# Patient Record
Sex: FEMALE | Race: WHITE
[De-identification: names, ages, dates, MRNs, and addresses within clinical notes are randomized per-mention and may not be internally consistent; named-entity substitution may affect disease eponyms.]

---

## 2019-09-01 ENCOUNTER — HOSPITAL ENCOUNTER (OUTPATIENT)
Dept: HOSPITAL 1 - CT | Age: 24
Discharge: HOME | End: 2019-09-01
Payer: COMMERCIAL

## 2019-09-01 ENCOUNTER — HOSPITAL ENCOUNTER (EMERGENCY)
Dept: HOSPITAL 26 - MED | Age: 24
Discharge: HOME | End: 2019-09-01
Payer: SELF-PAY

## 2019-09-01 VITALS — BODY MASS INDEX: 38.57 KG/M2 | HEIGHT: 66 IN | WEIGHT: 240 LBS

## 2019-09-01 VITALS — SYSTOLIC BLOOD PRESSURE: 140 MMHG | DIASTOLIC BLOOD PRESSURE: 72 MMHG

## 2019-09-01 VITALS — SYSTOLIC BLOOD PRESSURE: 93 MMHG | DIASTOLIC BLOOD PRESSURE: 40 MMHG
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
## 2019-09-01 DIAGNOSIS — S09.90XA: Primary | ICD-10-CM
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
## 2019-09-01 DIAGNOSIS — R51: ICD-10-CM

## 2019-09-01 DIAGNOSIS — R11.0: ICD-10-CM

## 2019-09-01 DIAGNOSIS — S39.012A: Primary | ICD-10-CM

## 2019-09-01 PROCEDURE — BW28ZZZ COMPUTERIZED TOMOGRAPHY (CT SCAN) OF HEAD: ICD-10-PCS
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
## 2019-09-01 NOTE — NUR
PT SLEEPING COMFORTABLY IN BED. AROUSABLE TO VERBAL STIMULI. VSS. SKIN PINK, 
WARM, DRY. BREATHING EVEN, UNLABORED. PT DENIES PAIN.